# Patient Record
Sex: FEMALE | Race: BLACK OR AFRICAN AMERICAN | Employment: FULL TIME | URBAN - METROPOLITAN AREA
[De-identification: names, ages, dates, MRNs, and addresses within clinical notes are randomized per-mention and may not be internally consistent; named-entity substitution may affect disease eponyms.]

---

## 2017-06-16 ENCOUNTER — OFFICE VISIT (OUTPATIENT)
Dept: ORTHOPEDIC SURGERY | Age: 57
End: 2017-06-16

## 2017-06-16 VITALS
TEMPERATURE: 97.9 F | DIASTOLIC BLOOD PRESSURE: 49 MMHG | BODY MASS INDEX: 34.82 KG/M2 | WEIGHT: 243.2 LBS | SYSTOLIC BLOOD PRESSURE: 118 MMHG | HEART RATE: 75 BPM | HEIGHT: 70 IN

## 2017-06-16 DIAGNOSIS — Z96.653 HISTORY OF BILATERAL KNEE REPLACEMENT: Primary | ICD-10-CM

## 2017-06-16 DIAGNOSIS — M06.9 RHEUMATOID ARTHRITIS INVOLVING MULTIPLE SITES, UNSPECIFIED RHEUMATOID FACTOR PRESENCE: ICD-10-CM

## 2017-06-16 RX ORDER — CELECOXIB 200 MG/1
CAPSULE ORAL
Qty: 30 CAP | Refills: 2 | Status: SHIPPED | OUTPATIENT
Start: 2017-06-16

## 2017-06-16 RX ORDER — TRAMADOL HYDROCHLORIDE 50 MG/1
50 TABLET ORAL
Qty: 25 TAB | Refills: 0 | Status: SHIPPED | OUTPATIENT
Start: 2017-06-16

## 2017-06-16 NOTE — PROGRESS NOTES
Patient: Radha Allen                MRN: [id number]       SSN: [id number]  YOB: 1960        AGE: 56 y.o. SEX: female  Body mass index is 34.9 kg/(m^2). PCP: Wayne Montoya MD  06/16/17    HISTORY: I had the pleasure of reviewing Laureen Sauceda. Laureen Sauceda has rheumatoid arthritis. We are going to mitzi her to a rheumatologist. It has been a few years. Overall, she has done extremely well with her knees. She is very pleased. The left one ended up with a little bit of stiffness but no pain. The right knee was replaced first, the left one second. She is here for routine followup. Again, no pain in the knees. She knows I like to see her every couple years for an x-ray, especially the first couple of years after surgery. PHYSICAL EXAMINATION:  Her hands have mild swelling at the MCP joints but not severely so. No major deformity. She moves the head and neck adequately. The hips rotate well. Each knee has a nicely healed incision. Good stability. A little bit of stiffness with flexion on the left. Otherwise, full range of motion and excellent stability. Patella is tracking midline. RADIOGRAPHS:  X-rays including AP, tunnel, lateral and skyline show the implants are nicely aligned and well fixed. IMPRESSION:  My overall impression is total knees, functioning extremely well. PLAN:  I am very pleased for her. She is on her feet a lot. I will get her plugged in with a rheumatologist. We will see her back in two years, sooner if there is any problem whatsoever. REVIEW OF SYSTEMS:      CON: negative for weight loss, fever  EYE: negative for double vision  ENT: negative for hoarseness  RS:   negative for Tb  GI:    negative for blood in stool  :  negative for blood in urine  Other systems reviewed and noted below. Past Medical History:   Diagnosis Date    Headache     Hypertension        History reviewed. No pertinent family history.     Current Outpatient Prescriptions   Medication Sig Dispense Refill    losartan-hydrochlorothiazide (HYZAAR) 50-12.5 mg per tablet       celecoxib (CELEBREX) 200 mg capsule 1 PO Daily with food 30 Cap 2    cephALEXin (KEFLEX) 500 mg capsule       CHERATUSSIN AC  mg/5 mL solution       cyclobenzaprine (FLEXERIL) 5 mg tablet       COMPOUNDMAX BASE crea 240 g by Does Not Apply route four (4) times daily. Anti-Inflam Meloxicam 0.09% Topirmate 1% Methocarbamol 2%  Lidocaine 2% Prilocaine 2% 240 g 3    COMPOUNDMAX BASE crea 240 g by Does Not Apply route four (4) times daily. Anti-Inflam Meloxicam 0.09% Topirmate 1% Methocarbamol 2%  Lidocaine 2% Prilocaine 2% 240 g 3    hydrochlorothiazide (HYDRODIURIL) 50 mg tablet Take 50 mg by mouth daily. No Known Allergies    Past Surgical History:   Procedure Laterality Date    HX KNEE REPLACEMENT Bilateral        Social History     Social History    Marital status:      Spouse name: N/A    Number of children: N/A    Years of education: N/A     Occupational History    Not on file. Social History Main Topics    Smoking status: Never Smoker    Smokeless tobacco: Never Used    Alcohol use No    Drug use: No    Sexual activity: Not on file     Other Topics Concern    Not on file     Social History Narrative       Visit Vitals    /49    Pulse 75    Temp 97.9 °F (36.6 °C) (Oral)    Ht 5' 10\" (1.778 m)    Wt 243 lb 3.2 oz (110.3 kg)    BMI 34.9 kg/m2         PHYSICAL EXAMINATION:  GENERAL: Alert and oriented x3, in no acute distress, well-developed, well-nourished, afebrile. HEART: No JVD. EYES: No scleral icterus   NECK: No significant lymphadenopathy   LUNGS: No respiratory compromise or indrawing  ABDOMEN: Soft, non-tender, non-distended. Electronically signed by:  Harvey Juarez MD

## 2017-06-16 NOTE — MR AVS SNAPSHOT
Visit Information Date & Time Provider Department Dept. Phone Encounter #  
 6/16/2017  2:00 PM Federico Salvador MD [facility] [phone] Upcoming Health Maintenance Date Due Hepatitis C Screening 1960 DTaP/Tdap/Td series (1 - Tdap) 2/22/1983 PAP AKA CERVICAL CYTOLOGY 2/22/1983 BREAST CANCER SCRN MAMMOGRAM 2/22/2012 FOBT Q 1 YEAR AGE 50-75 2/22/2012 INFLUENZA AGE 9 TO ADULT 8/1/2017 Allergies as of 6/16/2017  Review Complete On: 6/16/2017 By: Federico Salvador MD  
 No Known Allergies Current Immunizations  Never Reviewed No immunizations on file. Not reviewed this visit You Were Diagnosed With   
  
 Codes Comments History of bilateral knee replacement    -  Primary ICD-10-CM: I24.651 ICD-9-CM: V43.65 Rheumatoid arthritis involving multiple sites, unspecified [facility] factor [facility]     ICD-10-CM: M06.9 ICD-9-CM: 714.0 Vitals BP Pulse Temp Height(growth percentile) Weight(growth percentile) BMI  
 118/49 75 97.9 °F (36.6 °C) (Oral) 5' 10\" (1.778 m) 243 lb 3.2 oz (110.3 kg) 34.9 kg/m2 Smoking Status Never Smoker BMI and BSA Data Body Mass Index Body Surface Area 34.9 kg/m 2 2.33 m 2 Preferred Pharmacy Pharmacy Name Phone [facility] [phone] [facility] [phone] Your Updated Medication List  
  
   
This list is accurate as of: 6/16/17  2:24 PM.  Always use your most recent med list.  
  
  
  
  
 celecoxib 200 mg capsule Commonly known as:  CeleBREX  
1 PO Daily with food  
  
 cephALEXin 500 mg capsule Commonly known as:  Angelica Pluck CHERATUSSIN -10 mg/5 mL solution Generic drug:  guaiFENesin-codeine * COMPOUNDMAX BASE Crea Generic drug:  cream base no. 171 (bulk) 240 g by Does Not Apply route four (4) times daily.  Anti-Inflam Meloxicam 0.09% Topirmate 1% Methocarbamol 2%  Lidocaine 2% Prilocaine 2% * COMPOUNDMAX BASE Crea Generic drug:  cream base no. 171 (bulk) 240 g by Does Not Apply route four (4) times daily. Anti-Inflam Meloxicam 0.09% Topirmate 1% Methocarbamol 2%  Lidocaine 2% Prilocaine 2%  
  
 cyclobenzaprine 5 mg tablet Commonly known as:  FLEXERIL  
  
 hydroCHLOROthiazide 50 mg tablet Commonly known as:  HYDRODIURIL Take 50 mg by mouth daily. losartan-hydroCHLOROthiazide 50-12.5 mg per tablet Commonly known as:  HYZAAR * Notice: This list has 2 medication(s) that are the same as other medications prescribed for you. Read the directions carefully, and ask your doctor or other care provider to review them with you. We Performed the Following AMB POC X-RAY KNEE 3 VIEW [89398 CPT(R)] AMB POC X-RAY KNEE 3 VIEW [81367 CPT(R)] Patient Instructions Return to the office in 2 years and as needed. Rheumatoid Arthritis: Care Instructions Your Care Instructions Arthritis is a common health problem in which the joints are inflamed. There are many types of arthritis. In rheumatoid arthritis, the body's own immune system attacks the joints. This causes pain, stiffness, and swelling in the joints, especially in the hands and feet. It can become hard to open jars, write, and do other daily tasks. Sometimes rheumatoid arthritis can also cause bumps to form under the skin. Over time, rheumatoid arthritis can damage and deform joints. Early treatment with medicines may reduce your chances of having a lasting disability. Follow-up care is a key part of your treatment and safety. Be sure to make and go to all appointments, and call your doctor if you are having problems. Its also a good idea to know your test results and keep a list of the medicines you take. How can you care for yourself at home? · If your doctor recommends it, get more exercise.  Walking is a good choice. If your knees or ankles hurt, try riding a stationary bike or swimming. · Move each joint gently through its full range of motion once or twice a day. · Rest joints when they are sore or overworked. Short rest breaks may help more than staying in bed. · Reach and stay at a healthy weight. Regular exercise and a healthy diet will help you do this. Extra weight can strain the joints, especially the knees and hips, and make the pain worse. Losing even a few pounds may help. · Get enough calcium and vitamin D to help prevent osteoporosis, which causes thin bones. Talk to your doctor about how much you should take. · Protect your joints from injury. Do not overuse them. Try to limit or avoid activities that cause joint pain or swelling. Use special kitchen tools and other self-help devices as well as walkers, splints, or canes if needed. · Use heat to ease pain. Take warm showers or baths. Use hot packs or a heating pad set on low. Sleep under a warm electric blanket. · Put ice or a cold pack on the area for 10 to 20 minutes at a time. Put a thin cloth between the ice and your skin. · Take pain medicines exactly as directed. ¨ If the doctor gave you a prescription medicine for pain, take it as prescribed. ¨ If you are not taking a prescription pain medicine, ask your doctor if you can take an over-the-counter medicine. · Take an active role in managing your condition. Set up a treatment plan with your doctor, and learn as much as you can about rheumatoid arthritis. This will help you control pain and stay active. When should you call for help? Call your doctor now or seek immediate medical care if: 
· You have a fever or a rash along with joint pain. · You have joint pain that is so severe that you cannot use the joint at all. · You have sudden swelling, redness, or pain in one or more joints, and you do not know why. · You have back or neck pain along with weakness in your arms or legs. · You have a loss of bowel or bladder control. Watch closely for changes in your health, and be sure to contact your doctor if: 
· You have joint pain that lasts for more than 6 weeks. · You have side effects from your arthritis medicines, such as stomach pain, nausea, heartburn, or dark and tarlike stools. Where can you learn more? Go to http://vini-tom.info/. Enter K205 in the search box to learn more about \"Rheumatoid Arthritis: Care Instructions. \" Current as of: October 31, 2016 Content Version: 11.2 © 5792-1802 [email or web address]. Care instructions adapted under license by [employer] (which disclaims liability or warranty for this information). If you have questions about a medical condition or this instruction, always ask your healthcare professional. [address] any warranty or liability for your use of this information. Introducing [facility]! Estelle Cleverly introduces [employer] patient portal. Now you can access parts of your medical record, email your doctor's office, and request medication refills online. 1. In your internet browser, go to [email or web address] 2. Click on the First Time User? Click Here link in the Sign In box. You will see the New Member Sign Up page. 3. Enter your [employer] Access Code exactly as it appears below. You will not need to use this code after youve completed the sign-up process. If you do not sign up before the expiration date, you must request a new code. · [employer] Access Code: [email or web address] Expires: 9/14/2017  2:24 PM 
 
4. Enter the last four digits of your Social Security Number (xxxx) and Date of Birth (mm/dd/yyyy) as indicated and click Submit. You will be taken to the next sign-up page. 5. Create a Per[employer]ix ID. This will be your [employer] login ID and cannot be changed, so think of one that is secure and easy to remember. 6. Create a [employer] password. You can change your password at any time. 7. Enter your Password Reset Question and Answer. This can be used at a later time if you forget your password. 8. Enter your e-mail address. You will receive e-mail notification when new information is available in [address] 9. Click Sign Up. You can now view and download portions of your medical record. 10. Click the Download Summary menu link to download a portable copy of your medical information. If you have questions, please visit the Frequently Asked Questions section of the [employer] website. Remember, [employer] is NOT to be used for urgent needs. For medical emergencies, dial 911. Now available from your iPhone and Android! Please provide this summary of care documentation to your next provider. Your primary care clinician is listed as Claudia Strong. If you have any questions after today's visit, please call [phone].

## 2017-06-16 NOTE — PATIENT INSTRUCTIONS
Return to the office in 2 years and as needed. Rheumatoid Arthritis: Care Instructions  Your Care Instructions    Arthritis is a common health problem in which the joints are inflamed. There are many types of arthritis. In rheumatoid arthritis, the body's own immune system attacks the joints. This causes pain, stiffness, and swelling in the joints, especially in the hands and feet. It can become hard to open jars, write, and do other daily tasks. Sometimes rheumatoid arthritis can also cause bumps to form under the skin. Over time, rheumatoid arthritis can damage and deform joints. Early treatment with medicines may reduce your chances of having a lasting disability. Follow-up care is a key part of your treatment and safety. Be sure to make and go to all appointments, and call your doctor if you are having problems. Its also a good idea to know your test results and keep a list of the medicines you take. How can you care for yourself at home? · If your doctor recommends it, get more exercise. Walking is a good choice. If your knees or ankles hurt, try riding a stationary bike or swimming. · Move each joint gently through its full range of motion once or twice a day. · Rest joints when they are sore or overworked. Short rest breaks may help more than staying in bed. · Reach and stay at a healthy weight. Regular exercise and a healthy diet will help you do this. Extra weight can strain the joints, especially the knees and hips, and make the pain worse. Losing even a few pounds may help. · Get enough calcium and vitamin D to help prevent osteoporosis, which causes thin bones. Talk to your doctor about how much you should take. · Protect your joints from injury. Do not overuse them. Try to limit or avoid activities that cause joint pain or swelling. Use special kitchen tools and other self-help devices as well as walkers, splints, or canes if needed. · Use heat to ease pain. Take warm showers or baths. Use hot packs or a heating pad set on low. Sleep under a warm electric blanket. · Put ice or a cold pack on the area for 10 to 20 minutes at a time. Put a thin cloth between the ice and your skin. · Take pain medicines exactly as directed. ¨ If the doctor gave you a prescription medicine for pain, take it as prescribed. ¨ If you are not taking a prescription pain medicine, ask your doctor if you can take an over-the-counter medicine. · Take an active role in managing your condition. Set up a treatment plan with your doctor, and learn as much as you can about rheumatoid arthritis. This will help you control pain and stay active. When should you call for help? Call your doctor now or seek immediate medical care if:  · You have a fever or a rash along with joint pain. · You have joint pain that is so severe that you cannot use the joint at all. · You have sudden swelling, redness, or pain in one or more joints, and you do not know why. · You have back or neck pain along with weakness in your arms or legs. · You have a loss of bowel or bladder control. Watch closely for changes in your health, and be sure to contact your doctor if:  · You have joint pain that lasts for more than 6 weeks. · You have side effects from your arthritis medicines, such as stomach pain, nausea, heartburn, or dark and tarlike stools. Where can you learn more? Go to http://vini-tom.info/. Enter K205 in the search box to learn more about \"Rheumatoid Arthritis: Care Instructions. \"  Current as of: October 31, 2016  Content Version: 11.2  © 5507-9035 [employer]. Care instructions adapted under license by [employer] (which disclaims liability or warranty for this information). If you have questions about a medical condition or this instruction, always ask your healthcare professional. [address] any warranty or liability for your use of this information.

## 2017-12-28 ENCOUNTER — HOSPITAL ENCOUNTER (OUTPATIENT)
Dept: ULTRASOUND IMAGING | Age: 57
Discharge: HOME | End: 2017-12-28
Attending: INTERNAL MEDICINE
Payer: COMMERCIAL

## 2017-12-28 DIAGNOSIS — E04.9 ENLARGED THYROID: ICD-10-CM

## 2017-12-28 PROCEDURE — 76536 US EXAM OF HEAD AND NECK: CPT

## 2021-11-05 ENCOUNTER — OFFICE VISIT (OUTPATIENT)
Dept: ORTHOPEDIC SURGERY | Age: 61
End: 2021-11-05
Payer: COMMERCIAL

## 2021-11-05 VITALS
BODY MASS INDEX: 38.52 KG/M2 | OXYGEN SATURATION: 99 % | WEIGHT: 254.2 LBS | HEART RATE: 74 BPM | HEIGHT: 68 IN | TEMPERATURE: 97.8 F

## 2021-11-05 DIAGNOSIS — M05.9 RHEUMATOID ARTHRITIS WITH POSITIVE RHEUMATOID FACTOR, INVOLVING UNSPECIFIED SITE (HCC): ICD-10-CM

## 2021-11-05 DIAGNOSIS — M25.562 ACUTE PAIN OF LEFT KNEE: ICD-10-CM

## 2021-11-05 DIAGNOSIS — Z96.651 HISTORY OF TOTAL RIGHT KNEE REPLACEMENT (TKR): Primary | ICD-10-CM

## 2021-11-05 DIAGNOSIS — Z96.652 HISTORY OF TOTAL LEFT KNEE REPLACEMENT (TKR): ICD-10-CM

## 2021-11-05 PROCEDURE — 99214 OFFICE O/P EST MOD 30 MIN: CPT | Performed by: ORTHOPAEDIC SURGERY

## 2021-11-05 PROCEDURE — 73562 X-RAY EXAM OF KNEE 3: CPT | Performed by: ORTHOPAEDIC SURGERY

## 2021-11-05 NOTE — PROGRESS NOTES
Patient: Vivienne Santana                MRN: [id number]       SSN: [id number]  YOB: 1960        AGE: 63 y.o. SEX: female  Body mass index is 38.65 kg/m².     PCP: Pabltio Oviedo MD  11/05/21    Barbara Cardoza returns in follow-up as she banged her knee twice at work into an oxygen tank she is a patient care associate and the knee did swell up she had some bruising and its been feeling better as of recently he has about 3 weeks ago and then a week and a half ago she also has a very small piece of medial scar tissue about 3 to 4 mm on the medial knee itself that bothers her and I did palpate the area she has a little piece of scar tissue that is there/foreign body I would recommend an excision for her and we can do that as an outpatient under MAC    She walks well both hips rotate nicely both feet warm and well perfused no cyanosis or clubbing and she does have some bruising on the lateral aspect of the knee it is healing I think she had a little bit of a sprain involving the collateral ligament as well when she hit but there is a good endpoint calf is nontender Shaw Quivers' sign is negative hips are noncontributory she does have involvement of the MCP joints and no cyanosis peripheral edema clubbing sensation normal    Review of x-rays today she had 3 views of the right knee 11/5/2021 shows implants well aligned well fixed overall impression is contusion of the knee and knee sprain healing that happened at work she has a piece of very superficial medial scar and I be very happy to excise this for outpatient surgery she will let us know when she like to have it done we can accommodate her its been a pleasure to share in her care and I wanted to appreciate the opportunity    REVIEW OF SYSTEMS:      CON: negative  EYE: negative   ENT: negative  RESP: negative  GI:    negative   :  negative  MSK: Positive  A twelve point review of systems was completed, positives noted and all other systems were reviewed and are negative          Past Medical History:   Diagnosis Date    Headache     Hypertension     Sickle cell trait ([address]     Vitamin B12 deficiency     Vitamin D deficiency        Family History   Problem Relation Age of Onset    Heart Attack Mother     Stroke Mother     Prostate Cancer Father     Cancer Maternal Grandfather     Cancer Maternal Aunt        Current Outpatient Medications   Medication Sig Dispense Refill    losartan-hydrochlorothiazide (HYZAAR) 50-12.5 mg per tablet       celecoxib (CELEBREX) 200 mg capsule 1 PO Daily with food (Patient not taking: Reported on 11/5/2021) 30 Cap 2    traMADol (ULTRAM) 50 mg tablet Take 1 Tab by mouth every six (6) hours as needed for Pain. Max Daily Amount: 200 mg. (Patient not taking: Reported on 11/5/2021) 25 Tab 0    cephALEXin (KEFLEX) 500 mg capsule  (Patient not taking: Reported on 11/5/2021)      CHERATUSSIN AC  mg/5 mL solution  (Patient not taking: Reported on 11/5/2021)      cyclobenzaprine (FLEXERIL) 5 mg tablet       COMPOUNDMAX BASE crea 240 g by Does Not Apply route four (4) times daily. Anti-Inflam Meloxicam 0.09% Topirmate 1% Methocarbamol 2%  Lidocaine 2% Prilocaine 2% (Patient not taking: Reported on 11/5/2021) 240 g 3    COMPOUNDMAX BASE crea 240 g by Does Not Apply route four (4) times daily. Anti-Inflam Meloxicam 0.09% Topirmate 1% Methocarbamol 2%  Lidocaine 2% Prilocaine 2% (Patient not taking: Reported on 11/5/2021) 240 g 3    hydrochlorothiazide (HYDRODIURIL) 50 mg tablet Take 50 mg by mouth daily.  (Patient not taking: Reported on 11/5/2021)         Allergies   Allergen Reactions    Bactrim [Sulfamethoprim] Other (comments)       Past Surgical History:   Procedure Laterality Date    HX DILATION AND CURETTAGE      HX KNEE REPLACEMENT Bilateral     HX THYROIDECTOMY Bilateral 04/2021    HX TUBAL LIGATION         Social History     Socioeconomic History    Marital status:      Spouse name: Not on file    Number of children: Not on file    Years of education: Not on file    Highest education level: Not on file   Occupational History    Not on file   Tobacco Use    Smoking status: Never Smoker    Smokeless tobacco: Never Used   Substance and Sexual Activity    Alcohol use: No     Alcohol/week: 0.0 standard drinks    Drug use: No    Sexual activity: Not on file   Other Topics Concern    Not on file   Social History Narrative    Not on file     Social Determinants of Health     Financial Resource Strain:     Difficulty of Paying Living Expenses: Not on file   Food Insecurity:     Worried About Running Out of Food in the Last Year: Not on file    Teofilo of Food in the Last Year: Not on file   Transportation Needs:     Lack of Transportation (Medical): Not on file    Lack of Transportation (Non-Medical):  Not on file   Physical Activity:     Days of Exercise per Week: Not on file    Minutes of Exercise per Session: Not on file   Stress:     Feeling of Stress : Not on file   Social Connections:     Frequency of Communication with Friends and Family: Not on file    Frequency of Social Gatherings with Friends and Family: Not on file    Attends Judaism Services: Not on file    Active Member of [address] [place] [address] or Organizations: Not on file    Attends Club or Organization Meetings: Not on file    Marital Status: Not on file   Intimate Partner Violence:     Fear of Current or Ex-Partner: Not on file    Emotionally Abused: Not on file    Physically Abused: Not on file    Sexually Abused: Not on file   Housing Stability:     Unable to Pay for Housing in the Last Year: Not on file    Number of [place] in the Last Year: Not on file    Unstable Housing in the Last Year: Not on file       Visit Vitals  Pulse 74   Temp 97.8 °F (36.6 °C) (Temporal)   Ht 5' 8\" (1.727 m)   Wt 254 lb 3.2 oz (115.3 kg)   SpO2 99%   BMI 38.65 kg/m²         PHYSICAL EXAMINATION:  GENERAL: Alert and oriented x3, in no acute distress, well-developed, well-nourished, afebrile. HEART: No JVD. EYES: No scleral icterus   NECK: No significant lymphadenopathy   LUNGS: No respiratory compromise or indrawing  ABDOMEN: Soft, non-tender, non-distended. Note: This note was completed using voice recognition software. Any typographical/name errors or mistakes are unintentional.    Electronically signed by:  Gardenia Maria MD

## 2021-12-10 DIAGNOSIS — Z01.818 PREOPERATIVE TESTING: Primary | ICD-10-CM

## 2023-09-12 ENCOUNTER — OFFICE VISIT (OUTPATIENT)
Age: 63
End: 2023-09-12

## 2023-09-12 VITALS — HEIGHT: 68 IN | BODY MASS INDEX: 38.34 KG/M2 | WEIGHT: 253 LBS

## 2023-09-12 DIAGNOSIS — M25.562 LEFT KNEE PAIN, UNSPECIFIED CHRONICITY: ICD-10-CM

## 2023-09-12 DIAGNOSIS — M25.561 RIGHT KNEE PAIN, UNSPECIFIED CHRONICITY: ICD-10-CM

## 2023-09-12 DIAGNOSIS — Z96.652 PRESENCE OF LEFT ARTIFICIAL KNEE JOINT: ICD-10-CM

## 2023-09-12 DIAGNOSIS — M05.9 RHEUMATOID ARTHRITIS WITH RHEUMATOID FACTOR, UNSPECIFIED (HCC): ICD-10-CM

## 2023-09-12 DIAGNOSIS — L90.5 SCAR TISSUE: ICD-10-CM

## 2023-09-12 DIAGNOSIS — Z96.651 PRESENCE OF RIGHT ARTIFICIAL KNEE JOINT: Primary | ICD-10-CM

## 2023-09-12 RX ORDER — DICLOFENAC EPOLAMINE 0.01 G/1
1 SYSTEM TOPICAL 2 TIMES DAILY
Qty: 60 PATCH | Refills: 2 | Status: SHIPPED | OUTPATIENT
Start: 2023-09-12